# Patient Record
Sex: FEMALE | URBAN - METROPOLITAN AREA
[De-identification: names, ages, dates, MRNs, and addresses within clinical notes are randomized per-mention and may not be internally consistent; named-entity substitution may affect disease eponyms.]

---

## 2018-06-15 ENCOUNTER — TELEPHONE (OUTPATIENT)
Dept: PHARMACY | Facility: CLINIC | Age: 78
End: 2018-06-15

## 2018-08-21 ENCOUNTER — TELEPHONE (OUTPATIENT)
Dept: PHARMACY | Facility: CLINIC | Age: 78
End: 2018-08-21

## 2018-08-21 NOTE — LETTER
[address]  [address], [place] [address]  Phone: [phone]  Fax: [phone] [address]           08/21/18     Dear Max Munoz,    We tried to reach you recently regarding your glimepiride and simvastatin, but were unable to reach you on the telephone. It appears that this medication has not been filled at proper times. We are worried you might be missing doses or not taking it as directed. It is important that you take your medications regularly and try not to miss a single dose. Some ways to help you remember to take your medications are to use a pill box, set an alarm, and/or keep your medication near something that you do every day. It also may be helpful to fill a 3-month supply of your prescription at a time to save you time and trips to the pharmacy  if you would like assistance in switching your prescriptions to a 3-month supply, please contact us.     Sincerely,     [address]  Phone: [phone], option 7

## 2018-08-21 NOTE — TELEPHONE ENCOUNTER
CLINICAL PHARMACY: ADHERENCE REVIEW    Per [facility]: [address] [place]:   Glimepiride 4mg tablet last filled on 6/26/18 for a 30-day supply billed thru patient's [employer] insurance  Simvastatin 40 mg tablet last filled on 6/26/18 for a 30-day supply billed thru patient's [employer]    I got a phone number from [facility] at [address] [place]: [phone]. [facility] states that patient had been using vouchers to help pay for medications, but pharmacy has not seen patient since June. I left a vague message for Yvette Cole to return call. Unable to tell from voicemail if Makayla's phone. Attempting to reach patient to review. Left message asking for return call to [phone]. Will send letter.  ===========================================  For Pharmacy Admin Tracking Only  PHSO: Yes  Total # of Interventions Recommended: 1  - New Order #: 1 New Medication Order Reason(s):  Adherence  - Refills Provided #: 0  - Maintenance Safety Lab Monitoring #: 1  - New Therapy Lab Monitoring #: 0  Total Interventions Accepted: 0  Time Spent (min): Jose Luis Schaefer, PharmD  [address]